# Patient Record
Sex: MALE | Race: WHITE | NOT HISPANIC OR LATINO | RURAL
[De-identification: names, ages, dates, MRNs, and addresses within clinical notes are randomized per-mention and may not be internally consistent; named-entity substitution may affect disease eponyms.]

---

## 2023-12-06 ENCOUNTER — ANESTHESIA EVENT (OUTPATIENT)
Dept: SURGERY | Facility: HOSPITAL | Age: 68
End: 2023-12-06
Payer: MEDICARE

## 2023-12-06 DIAGNOSIS — R97.20 ELEVATED PSA: Primary | ICD-10-CM

## 2023-12-06 RX ORDER — DIPHENHYDRAMINE HYDROCHLORIDE 50 MG/ML
25 INJECTION INTRAMUSCULAR; INTRAVENOUS EVERY 6 HOURS PRN
Status: CANCELLED | OUTPATIENT
Start: 2023-12-07

## 2023-12-06 RX ORDER — SODIUM CHLORIDE, SODIUM LACTATE, POTASSIUM CHLORIDE, CALCIUM CHLORIDE 600; 310; 30; 20 MG/100ML; MG/100ML; MG/100ML; MG/100ML
INJECTION, SOLUTION INTRAVENOUS CONTINUOUS
Status: CANCELLED | OUTPATIENT
Start: 2023-12-07

## 2023-12-06 RX ORDER — ONDANSETRON 2 MG/ML
4 INJECTION INTRAMUSCULAR; INTRAVENOUS EVERY 4 HOURS PRN
Status: CANCELLED | OUTPATIENT
Start: 2023-12-07

## 2023-12-07 ENCOUNTER — ANESTHESIA (OUTPATIENT)
Dept: SURGERY | Facility: HOSPITAL | Age: 68
End: 2023-12-07
Payer: MEDICARE

## 2023-12-07 ENCOUNTER — HOSPITAL ENCOUNTER (OUTPATIENT)
Facility: HOSPITAL | Age: 68
Discharge: HOME | End: 2023-12-07
Attending: UROLOGY | Admitting: UROLOGY
Payer: MEDICARE

## 2023-12-07 VITALS
HEIGHT: 68 IN | BODY MASS INDEX: 26.67 KG/M2 | SYSTOLIC BLOOD PRESSURE: 152 MMHG | RESPIRATION RATE: 16 BRPM | OXYGEN SATURATION: 100 % | HEART RATE: 63 BPM | WEIGHT: 176 LBS | TEMPERATURE: 98 F | DIASTOLIC BLOOD PRESSURE: 53 MMHG

## 2023-12-07 VITALS
RESPIRATION RATE: 12 BRPM | OXYGEN SATURATION: 99 % | SYSTOLIC BLOOD PRESSURE: 92 MMHG | DIASTOLIC BLOOD PRESSURE: 43 MMHG | HEART RATE: 56 BPM

## 2023-12-07 DIAGNOSIS — R97.20 ELEVATED PSA: ICD-10-CM

## 2023-12-07 DIAGNOSIS — R93.89 ABNORMAL MRI: ICD-10-CM

## 2023-12-07 PROCEDURE — 88344 IMHCHEM/IMCYTCHM EA MLT ANTB: CPT | Mod: 26,59,ICN, | Performed by: PATHOLOGY

## 2023-12-07 PROCEDURE — 71000033 HC RECOVERY, INTIAL HOUR: Performed by: UROLOGY

## 2023-12-07 PROCEDURE — 88344 PR IHC OR ICC EACH MULTIPLEX ANTIBODY STAIN PROCEDURE: ICD-10-PCS | Mod: 26,59,ICN, | Performed by: PATHOLOGY

## 2023-12-07 PROCEDURE — 37000009 HC ANESTHESIA EA ADD 15 MINS: Performed by: UROLOGY

## 2023-12-07 PROCEDURE — 36000704 HC OR TIME LEV I 1ST 15 MIN: Performed by: UROLOGY

## 2023-12-07 PROCEDURE — 88344 IMHCHEM/IMCYTCHM EA MLT ANTB: CPT | Mod: 26,ICN,, | Performed by: PATHOLOGY

## 2023-12-07 PROCEDURE — 88344 IMHCHEM/IMCYTCHM EA MLT ANTB: CPT | Mod: TC,SUR,59 | Performed by: UROLOGY

## 2023-12-07 PROCEDURE — 25000003 PHARM REV CODE 250: Performed by: ANESTHESIOLOGY

## 2023-12-07 PROCEDURE — 71000015 HC POSTOP RECOV 1ST HR: Performed by: UROLOGY

## 2023-12-07 PROCEDURE — 27000510 HC BLANKET BAIR HUGGER ANY SIZE: Performed by: NURSE ANESTHETIST, CERTIFIED REGISTERED

## 2023-12-07 PROCEDURE — D9220A PRA ANESTHESIA: ICD-10-PCS | Mod: CRNA,,, | Performed by: NURSE ANESTHETIST, CERTIFIED REGISTERED

## 2023-12-07 PROCEDURE — D9220A PRA ANESTHESIA: Mod: ANES,,, | Performed by: ANESTHESIOLOGY

## 2023-12-07 PROCEDURE — 36000705 HC OR TIME LEV I EA ADD 15 MIN: Performed by: UROLOGY

## 2023-12-07 PROCEDURE — 27000177 HC AIRWAY, LARYNGEAL MASK: Performed by: NURSE ANESTHETIST, CERTIFIED REGISTERED

## 2023-12-07 PROCEDURE — 27000716 HC OXISENSOR PROBE, ANY SIZE: Performed by: NURSE ANESTHETIST, CERTIFIED REGISTERED

## 2023-12-07 PROCEDURE — 63600175 PHARM REV CODE 636 W HCPCS: Performed by: UROLOGY

## 2023-12-07 PROCEDURE — D9220A PRA ANESTHESIA: ICD-10-PCS | Mod: ANES,,, | Performed by: ANESTHESIOLOGY

## 2023-12-07 PROCEDURE — G0416 SURGICAL PATHOLOGY: ICD-10-PCS | Mod: 26,ICN,, | Performed by: PATHOLOGY

## 2023-12-07 PROCEDURE — 37000008 HC ANESTHESIA 1ST 15 MINUTES: Performed by: UROLOGY

## 2023-12-07 PROCEDURE — 63600175 PHARM REV CODE 636 W HCPCS: Performed by: NURSE ANESTHETIST, CERTIFIED REGISTERED

## 2023-12-07 PROCEDURE — G0416 PROSTATE BIOPSY, ANY MTHD: HCPCS | Mod: 26,ICN,, | Performed by: PATHOLOGY

## 2023-12-07 PROCEDURE — D9220A PRA ANESTHESIA: Mod: CRNA,,, | Performed by: NURSE ANESTHETIST, CERTIFIED REGISTERED

## 2023-12-07 PROCEDURE — 25000003 PHARM REV CODE 250: Performed by: NURSE ANESTHETIST, CERTIFIED REGISTERED

## 2023-12-07 PROCEDURE — 71000016 HC POSTOP RECOV ADDL HR: Performed by: UROLOGY

## 2023-12-07 RX ORDER — DIPHENHYDRAMINE HYDROCHLORIDE 50 MG/ML
25 INJECTION INTRAMUSCULAR; INTRAVENOUS EVERY 6 HOURS PRN
Status: DISCONTINUED | OUTPATIENT
Start: 2023-12-07 | End: 2023-12-07 | Stop reason: HOSPADM

## 2023-12-07 RX ORDER — ONDANSETRON 2 MG/ML
INJECTION INTRAMUSCULAR; INTRAVENOUS
Status: DISCONTINUED | OUTPATIENT
Start: 2023-12-07 | End: 2023-12-07

## 2023-12-07 RX ORDER — BLACK COHOSH ROOT 200 MG
500 CAPSULE ORAL DAILY
COMMUNITY

## 2023-12-07 RX ORDER — SIMVASTATIN 40 MG/1
40 TABLET, FILM COATED ORAL NIGHTLY
COMMUNITY

## 2023-12-07 RX ORDER — DEXAMETHASONE SODIUM PHOSPHATE 4 MG/ML
INJECTION, SOLUTION INTRA-ARTICULAR; INTRALESIONAL; INTRAMUSCULAR; INTRAVENOUS; SOFT TISSUE
Status: DISCONTINUED | OUTPATIENT
Start: 2023-12-07 | End: 2023-12-07

## 2023-12-07 RX ORDER — FENTANYL CITRATE 50 UG/ML
INJECTION, SOLUTION INTRAMUSCULAR; INTRAVENOUS
Status: DISCONTINUED | OUTPATIENT
Start: 2023-12-07 | End: 2023-12-07

## 2023-12-07 RX ORDER — LISINOPRIL 10 MG/1
10 TABLET ORAL DAILY
COMMUNITY

## 2023-12-07 RX ORDER — LIDOCAINE HYDROCHLORIDE 20 MG/ML
INJECTION, SOLUTION EPIDURAL; INFILTRATION; INTRACAUDAL; PERINEURAL
Status: DISCONTINUED | OUTPATIENT
Start: 2023-12-07 | End: 2023-12-07

## 2023-12-07 RX ORDER — MIDAZOLAM HYDROCHLORIDE 1 MG/ML
INJECTION INTRAMUSCULAR; INTRAVENOUS
Status: DISCONTINUED | OUTPATIENT
Start: 2023-12-07 | End: 2023-12-07

## 2023-12-07 RX ORDER — GLYCOPYRROLATE 0.2 MG/ML
INJECTION INTRAMUSCULAR; INTRAVENOUS
Status: DISCONTINUED | OUTPATIENT
Start: 2023-12-07 | End: 2023-12-07

## 2023-12-07 RX ORDER — SODIUM CHLORIDE 9 MG/ML
INJECTION, SOLUTION INTRAVENOUS CONTINUOUS
Status: DISCONTINUED | OUTPATIENT
Start: 2023-12-07 | End: 2023-12-07 | Stop reason: HOSPADM

## 2023-12-07 RX ORDER — MORPHINE SULFATE 10 MG/ML
4 INJECTION INTRAMUSCULAR; INTRAVENOUS; SUBCUTANEOUS EVERY 5 MIN PRN
Status: DISCONTINUED | OUTPATIENT
Start: 2023-12-07 | End: 2023-12-07 | Stop reason: HOSPADM

## 2023-12-07 RX ORDER — NAPROXEN SODIUM 220 MG/1
81 TABLET, FILM COATED ORAL DAILY
Status: ON HOLD | COMMUNITY
End: 2023-12-07 | Stop reason: HOSPADM

## 2023-12-07 RX ORDER — GENTAMICIN SULFATE 40 MG/ML
120 INJECTION, SOLUTION INTRAMUSCULAR; INTRAVENOUS
Status: DISPENSED | OUTPATIENT
Start: 2023-12-07

## 2023-12-07 RX ORDER — HYDROMORPHONE HYDROCHLORIDE 2 MG/ML
0.5 INJECTION, SOLUTION INTRAMUSCULAR; INTRAVENOUS; SUBCUTANEOUS EVERY 5 MIN PRN
Status: DISCONTINUED | OUTPATIENT
Start: 2023-12-07 | End: 2023-12-07 | Stop reason: HOSPADM

## 2023-12-07 RX ORDER — MEPERIDINE HYDROCHLORIDE 25 MG/ML
25 INJECTION INTRAMUSCULAR; INTRAVENOUS; SUBCUTANEOUS ONCE AS NEEDED
Status: DISCONTINUED | OUTPATIENT
Start: 2023-12-07 | End: 2023-12-07 | Stop reason: HOSPADM

## 2023-12-07 RX ORDER — NAPROXEN SODIUM 220 MG/1
1 TABLET ORAL DAILY
COMMUNITY

## 2023-12-07 RX ORDER — HYDROCODONE BITARTRATE AND ACETAMINOPHEN 5; 325 MG/1; MG/1
1 TABLET ORAL EVERY 6 HOURS PRN
Status: DISCONTINUED | OUTPATIENT
Start: 2023-12-07 | End: 2023-12-07 | Stop reason: HOSPADM

## 2023-12-07 RX ORDER — PROPOFOL 10 MG/ML
VIAL (ML) INTRAVENOUS
Status: DISCONTINUED | OUTPATIENT
Start: 2023-12-07 | End: 2023-12-07

## 2023-12-07 RX ORDER — ONDANSETRON 2 MG/ML
4 INJECTION INTRAMUSCULAR; INTRAVENOUS DAILY PRN
Status: DISCONTINUED | OUTPATIENT
Start: 2023-12-07 | End: 2023-12-07 | Stop reason: HOSPADM

## 2023-12-07 RX ADMIN — SODIUM CHLORIDE: 9 INJECTION, SOLUTION INTRAVENOUS at 08:12

## 2023-12-07 RX ADMIN — PROPOFOL 140 MG: 10 INJECTION, EMULSION INTRAVENOUS at 09:12

## 2023-12-07 RX ADMIN — GLYCOPYRROLATE 0.2 MG: 0.2 INJECTION INTRAMUSCULAR; INTRAVENOUS at 09:12

## 2023-12-07 RX ADMIN — FENTANYL CITRATE 25 MCG: 50 INJECTION INTRAMUSCULAR; INTRAVENOUS at 09:12

## 2023-12-07 RX ADMIN — LIDOCAINE HYDROCHLORIDE 70 MG: 20 INJECTION, SOLUTION INTRAVENOUS at 09:12

## 2023-12-07 RX ADMIN — FENTANYL CITRATE 50 MCG: 50 INJECTION INTRAMUSCULAR; INTRAVENOUS at 09:12

## 2023-12-07 RX ADMIN — ONDANSETRON 4 MG: 2 INJECTION INTRAMUSCULAR; INTRAVENOUS at 09:12

## 2023-12-07 RX ADMIN — MIDAZOLAM 2 MG: 1 INJECTION INTRAMUSCULAR; INTRAVENOUS at 09:12

## 2023-12-07 RX ADMIN — DEXAMETHASONE SODIUM PHOSPHATE 6 MG: 4 INJECTION, SOLUTION INTRA-ARTICULAR; INTRALESIONAL; INTRAMUSCULAR; INTRAVENOUS; SOFT TISSUE at 09:12

## 2023-12-07 RX ADMIN — GENTAMICIN SULFATE 120 MG: 40 INJECTION, SOLUTION INTRAMUSCULAR; INTRAVENOUS at 08:12

## 2023-12-07 RX ADMIN — SODIUM CHLORIDE: 9 INJECTION, SOLUTION INTRAVENOUS at 09:12

## 2023-12-07 NOTE — OR NURSING
0955 Rec'd pt to PACU asleep with no distress noted, respirations even and unlabored. VSS. No bleeding noted from rectum. No needs. Will continue to monitor.     1027 Out of PACU. VSS. No signs of bleeding/distress noted.     1030 Pt to ASC 1 awake and alert. No signs of distress noted, respirations even and unlabored. Family at bedside. Bedside report given to DOMENIC Fang RN. No bleeding noted from rectum. Denies pain/needs. /68, P 57, R 16, O2 98% RA.

## 2023-12-07 NOTE — ANESTHESIA PREPROCEDURE EVALUATION
12/07/2023  Shaij Holland is a 68 y.o., male.      Pre-op Assessment    I have reviewed the Patient Summary Reports.     I have reviewed the Nursing Notes. I have reviewed the NPO Status.   I have reviewed the Medications.     Review of Systems  Anesthesia Hx:  No problems with previous Anesthesia             Denies Family Hx of Anesthesia complications.    Denies Personal Hx of Anesthesia complications.                    Social:  Non-Smoker, No Alcohol Use       Cardiovascular:  Exercise tolerance: good   Hypertension           hyperlipidemia                             Renal/:    BPH Elevated PSA                 Physical Exam  General: Well nourished, Cooperative and Alert    Airway:  Mallampati: II   Mouth Opening: Normal  TM Distance: Normal  Tongue: Normal  Neck ROM: Normal ROM    Dental:  Intact    Chest/Lungs:  Clear to auscultation, Normal Respiratory Rate    Heart:  Rate: Normal  Rhythm: Regular Rhythm        Anesthesia Plan  Type of Anesthesia, risks & benefits discussed:    Anesthesia Type: Gen Supraglottic Airway  Intra-op Monitoring Plan: Standard ASA Monitors  Post Op Pain Control Plan: multimodal analgesia  Induction:  IV  Airway Plan: Direct, Post-Induction  Informed Consent: Informed consent signed with the Patient and all parties understand the risks and agree with anesthesia plan.  All questions answered.   ASA Score: 2  Day of Surgery Review of History & Physical: H&P Update referred to the surgeon/provider.I have interviewed and examined the patient. I have reviewed the patient's H&P dated: There are no significant changes.     Ready For Surgery From Anesthesia Perspective.     .

## 2023-12-07 NOTE — H&P
Shaji Holland  : 1955  MRN # [id number]  Date of service:  2023    Reason for appointment:  TALK BIOPSY    History of present illness:   Patient returns.  His PSA has dropped to 14.98.  Is better.  His MRI showed a PI-RADS 2.  He still could have a potential prostate cancer.  The likelihood of a high-grade cancer was low but not 0.  He could have a lower grade cancer in potential a lot of it.  The margins of the prostate looks okay along with the similar vesicles and the lymph nodes.   We discussed options.  We could watch and give it time or we could do a truss with biopsies.  The patient favors doing the biopsy.   I discussed transrectal ultrasound with biopsy.  We will do this as an outpatient under anesthesia.  He is on a baby aspirin and he will have to hold that for 5 days and no other nonsteroidal anti-inflammatories.  Pre and postoperative care explained.  He will need to yahir 2 days of inactivity and then on the 3rd day he may resume activities.   Risks, complications, outcomes, sequela, prognosis explained.  Complications include but are not limited to bleeding, infection and rare hospitalization.  Patient understands this and agrees to proceed.    Vital signs:  HR: 79, RR: 18, BP: 140/58, Ht: 68, Wt: 186.6, BMI: 28.37, Oxygen Sat %: 98    Medical history verified, allergies verified, surgical history verified, hospitalizations verified, family history verified, social history verified, and vital signs taken by Viviana Albert.    EXAMINATION:  General examination:  CHEST: Clear to auscultation  HEART:  Regular rate and rhythm  ABDOMEN: Soft, flat, nontender, no masses, no flank masses, no CVA tenderness..  RECTAL:  Good tone, no masses, prostate approximally 35-40 g.  No asymmetry.  No palpable nodularity.  MALE GENITOURINARY:  Normal uncircumcised penis without lesion, meatus normal, testes descended bilaterally, no testicular mass, cord structures normal, no  hernia.  EXTREMITIES:  No clubbing, cyanosis, or edema  NEUROLOGIC:  Grossly intact.    Assessment:  1. Essential hypertension - I10 (Primary)  2. Benign prostatic hyperplasia without lower urinary tract symptoms - 40.0  3. Preop evaluation - Z01.818  4. Elevated PSA - R97.20    Patient with BPH and elevated PSA.  PI-RADS 2 MRI.  Has large prostate with no nodularity or asymmetry.  We have discussed options.  He has elected to undergo a transrectal ultrasound.  We will schedule this as an outpatient.  I have gone over the procedure and its complications.  Risks and benefits were discussed.  Patient understands this and agrees to proceed.    Past medical history:  Hypertension  Hyperlipidemia    Surgical history:  Tonsillectomy  Right knee arthroscopy  Left total knee replacement 2022    Family history:  Mother:  , diagnosed with diabetes, heart disease, cancer  Father:    Brother:  Alive, diagnosed with diabetes  Son(s):  Alive  Four brothers - 2 sons    Social history:  Tobacco use:  Tobacco use/smoking: Are you a former smoker?  Yes  How long has not been since your last smoked? >10 years    Allergies:  N.K.D.A.    Current medication(s):  Amlodipine Besylate 5 mg tablet 1 tablet orally once a day  Vitamin-C 500 mg tablet chewable 1 tablet orally once a day  Vitamin D3 50 mcg (2000 UT) capsule 1 capsule orally once a day  Zinc 50 mg tablets 1 tablet orally once a day  Fish Oil 1200 mg capsule 1 capsule orally once a day  Aspirin 81 mg tablet 1 tablet orally once a day  Simvastatin 40 mg tablet 1 tablet in the evening orally once a day  Ezetimibe 10 mg tablet 1 tablet orally once a day  Not Taking:  Lisinopril 10 mg tablet 1 tablet orally once a day  Vitamin-D 25 mcg (1000 UTI) tablet 1 tablet orally once a day  Multivitamin tablet 1 tablet orally once a day  Ed-A-Hist 4-10 tablet 1 tablet orally BID  Clotrimazole-Betamethasone 1-0.05% cream 1 application externally twice a day  Mupirocin 2%  Ointment 1 application externally twice a day  Doxycycline Hyclate 100 mg tablet 1 tablet orally BID  Medication list reviewed and reconciled with the patient    Treatment:  1. Essential hypertension        LAB:  IH urine dipstick (Ordered for 10/23/2023)  Clinical notes:  Schedule a transrectal ultrasound with biopsies as an outpatient under anesthesia    2.  Preop evaluation   LAB: CBC (Complete Blood Count) (Ordered for 11/30/2023)   LAB: BMP (Basic Metabolic Panel) (Ordered for 11/30/2023)   LAB: Urinalysis  w/ culture, if indicated(Ordered for 11/30/2023)   IMAGING: CXR2V (Ordered for 11/30/2023)    3.  Others  Start Sulfamethoxazole-Trimethoprim tablet, 800-160 mg, 1 tablet orally 1 tablet the night before procedure, the night after the procedure, then twice daily till gone, 5 days, # 7  Refills 0.

## 2023-12-07 NOTE — OP NOTE
12/07/2023    Preoperative diagnosis:  BPH with elevated PSA with PI-RADS 2 MRI.    Postop diagnosis: Same.    Procedure:  Transrectal ultrasound with biopsy of the prostate.    Surgeon:  Elias Cardona MD.    EBL:  5 cc.    Complications: None.    Brief clinical summary:  Patient is a 68-year-old white male that we have followed for some time with BPH.  His PSA has risen to 14.98.  MRI reveals a PI-RADS 2 interpretation.  I have recommended a transrectal ultrasound with biopsies.  This procedure was discussed at length and in detail.  Risks, complications, outcomes, sequelae, prognosis and alternative therapy discussed.  Patient understood this and agreed to proceed.      Technique:  Patient is brought to the operative suite and placed on the operative table in the left lateral decubitus position.  He was given a general LMA anesthetic which he tolerated well.  He was then positioned properly in all pressure points were padded.    The transrectal ultrasound probe was well lubricated and inserted in the rectum atraumatically.  The prostate is then examined in the transverse and lateral imaging.  The prostate is then measured to be 42 g. its length is 4.5 cm, height is 3.37 cm and width is 5.30 cm.  His PSA density is 0.35 which is significantly elevated.  Under imaging the prostate has hypoechoic area posteriorly from about mid left to mid right.    The biopsy needle was then used to obtain sextant biopsies.  Patient tolerated this well.  There were no complications.  The ultrasound probe was then removed and perineal pressure was held for 5 minutes.  Patient was then awakened from general anesthesia and sent to recovery room in stable condition.

## 2023-12-07 NOTE — TRANSFER OF CARE
"Anesthesia Transfer of Care Note    Patient: Shaji Holland    Procedure(s) Performed: Procedure(s) (LRB):  BIOPSY, PROSTATE, USING PROSTATE MAPPING (N/A)    Patient location: PACU    Anesthesia Type: general    Transport from OR: Transported from OR on 6-10 L/min O2 by face mask with adequate spontaneous ventilation    Post pain: adequate analgesia    Post assessment: no apparent anesthetic complications    Post vital signs: stable    Level of consciousness: responds to stimulation and awake    Nausea/Vomiting: no nausea/vomiting    Complications: none    Transfer of care protocol was followedComments: Good SV continue, NAD noted, VSS, RTRN      Last vitals: Visit Vitals  BP (!) 138/59   Pulse (!) 59   Temp 36.7 °C (98 °F) (Oral)   Resp 11   Ht 5' 8" (1.727 m)   Wt 79.8 kg (176 lb)   SpO2 100%   BMI 26.76 kg/m²     "

## 2023-12-07 NOTE — DISCHARGE SUMMARY
[facility] - Periop Services  Discharge Note  Short Stay    Procedure(s) (LRB):  BIOPSY, PROSTATE, USING PROSTATE MAPPING (N/A)      OUTCOME: Patient tolerated treatment/procedure well without complication and is now ready for discharge.    DISPOSITION: Home or Self Care    FINAL DIAGNOSIS:  Elevated PSA    FOLLOWUP: In clinic    DISCHARGE INSTRUCTIONS:  No discharge procedures on file.     TIME SPENT ON DISCHARGE: 5 minutes

## 2023-12-07 NOTE — ANESTHESIA PROCEDURE NOTES
LMA    Date/Time: 12/7/2023 9:31 AM    Performed by: Nam Sullivan CRNA  Authorized by: Lemuel Ryan DO    Intubation:     Induction:  Intravenous    Intubated:  Postinduction    Mask Ventilation:  Easy mask    Attempts:  1    Attempted By:  CRNA    Difficult Airway Encountered?: No      Complications:  None    Airway Device:  Supraglottic airway/LMA    Airway Device Size:  4.0    Style/Cuff Inflation:  Cuffed    Endotracheal tube placement: TO SEAL.    Placement Verified By:  Capnometry    Complicating Factors:  Poor neck/head extension    Findings Post-Intubation:  BS equal bilateral and atraumatic/condition of teeth unchanged  Notes:      SMOOTH, TOLERATED WELL

## 2023-12-07 NOTE — ANESTHESIA POSTPROCEDURE EVALUATION
Anesthesia Post Evaluation    Patient: Shaji Holland    Procedure(s) Performed: Procedure(s) (LRB):  BIOPSY, PROSTATE, USING PROSTATE MAPPING (N/A)    Final Anesthesia Type: general      Patient location during evaluation: PACU  Patient participation: Yes- Able to Participate  Level of consciousness: awake and alert and oriented  Post-procedure vital signs: reviewed and stable  Pain management: adequate  Airway patency: patent  DHIRAJ mitigation strategies: Multimodal analgesia  PONV status at discharge: No PONV  Anesthetic complications: no      Cardiovascular status: hemodynamically stable  Respiratory status: unassisted and spontaneous ventilation  Hydration status: euvolemic  Follow-up not needed.              Vitals Value Taken Time   /59 12/07/23 1102   Temp 36.7 °C (98 °F) 12/07/23 0958   Pulse 66 12/07/23 1102   Resp 14 12/07/23 1045   SpO2 99 % 12/07/23 1102   Vitals shown include unvalidated device data.      Event Time   Out of Recovery 10:27:00         Pain/Viridiana Score: Viridiana Score: 10 (12/7/2023 10:30 AM)

## 2023-12-07 NOTE — INTERVAL H&P NOTE
The patient has been examined and the H&P has been reviewed:    I concur with the findings and no changes have occurred since H&P was written.    Surgery risks, benefits and alternative options discussed and understood by patient/family.    There are no hospital problems to display for this patient.    Shaji Holland  : 1955  MRN # [id number]    UPDATE 2023:  Mr. Holland's H&P has not changed since seen last month.  He has BPH and an elevated PSA. Has a PI-RADS 2 prostate MRI with a large prostate, no nodularity or asymmetry.  We have discussed options.  Mr. Holland has elected to undergo a transrectal ultrasound with prostate biopies.  I again have gone over the procedure and its complications.  Risks and benefits were again discussed.  PSA 14.98    General examination:  CHEST: Clear to auscultation  HEART:  Regular rate and rhythm  ABDOMEN: Soft, flat, nontender, no masses, no flank masses, no CVA tenderness..  EXTREMITIES:  No clubbing, cyanosis, or edema  NEUROLOGIC:  Grossly intact.    Assessment:  1. Essential hypertension - I10 (Primary)  2. Benign prostatic hyperplasia without lower urinary tract symptoms - 40.0  3. Elevated PSA - R97.20    Plan: Patient understands the transrectal ultrasound of the prostate with biopsies and agrees to proceed with the procedure this morning.

## 2023-12-13 LAB
DHEA SERPL-MCNC: NORMAL
ESTROGEN SERPL-MCNC: NORMAL PG/ML
INSULIN SERPL-ACNC: NORMAL U[IU]/ML
LAB AP GROSS DESCRIPTION: NORMAL
LAB AP LABORATORY NOTES: NORMAL
T3RU NFR SERPL: NORMAL %

## (undated) DEVICE — NDL MAXCORE BIOPSY 18G 25CM

## (undated) DEVICE — NEEDLE GUIDE ENDOCAVITY